# Patient Record
Sex: FEMALE | Race: WHITE | NOT HISPANIC OR LATINO | URBAN - METROPOLITAN AREA
[De-identification: names, ages, dates, MRNs, and addresses within clinical notes are randomized per-mention and may not be internally consistent; named-entity substitution may affect disease eponyms.]

---

## 2023-10-30 ENCOUNTER — TELEPHONE (OUTPATIENT)
Dept: PRIMARY CARE | Facility: CLINIC | Age: 58
End: 2023-10-30

## 2023-10-30 DIAGNOSIS — H60.339 ACUTE SWIMMER'S EAR, UNSPECIFIED LATERALITY: Primary | ICD-10-CM

## 2023-10-30 RX ORDER — NEOMYCIN SULFATE, POLYMYXIN B SULFATE, HYDROCORTISONE 3.5; 10000; 1 MG/ML; [USP'U]/ML; MG/ML
2 SOLUTION/ DROPS AURICULAR (OTIC) 4 TIMES DAILY
Qty: 10 ML | Refills: 0 | Status: SHIPPED | OUTPATIENT
Start: 2023-10-30 | End: 2023-11-06

## 2024-10-29 ENCOUNTER — OFFICE VISIT (OUTPATIENT)
Dept: PRIMARY CARE | Facility: CLINIC | Age: 59
End: 2024-10-29
Payer: COMMERCIAL

## 2024-10-29 VITALS
OXYGEN SATURATION: 97 % | RESPIRATION RATE: 20 BRPM | SYSTOLIC BLOOD PRESSURE: 120 MMHG | HEART RATE: 71 BPM | HEIGHT: 64 IN | TEMPERATURE: 98.7 F | DIASTOLIC BLOOD PRESSURE: 77 MMHG | BODY MASS INDEX: 40.97 KG/M2 | WEIGHT: 240 LBS

## 2024-10-29 DIAGNOSIS — H60.339 ACUTE SWIMMER'S EAR, UNSPECIFIED LATERALITY: ICD-10-CM

## 2024-10-29 DIAGNOSIS — D17.22 LIPOMA OF LEFT UPPER EXTREMITY: Primary | ICD-10-CM

## 2024-10-29 PROCEDURE — 99213 OFFICE O/P EST LOW 20 MIN: CPT | Performed by: FAMILY MEDICINE

## 2024-10-29 PROCEDURE — 3008F BODY MASS INDEX DOCD: CPT | Performed by: FAMILY MEDICINE

## 2024-10-29 RX ORDER — NEOMYCIN SULFATE, POLYMYXIN B SULFATE, HYDROCORTISONE 3.5; 10000; 1 MG/ML; [USP'U]/ML; MG/ML
2 SOLUTION/ DROPS AURICULAR (OTIC) 4 TIMES DAILY
Qty: 10 ML | Refills: 1 | Status: SHIPPED | OUTPATIENT
Start: 2024-10-29 | End: 2024-11-05

## 2024-10-29 ASSESSMENT — PAIN SCALES - GENERAL: PAINLEVEL_OUTOF10: 0-NO PAIN

## 2024-10-29 ASSESSMENT — ENCOUNTER SYMPTOMS
LOSS OF SENSATION IN FEET: 0
DEPRESSION: 0
OCCASIONAL FEELINGS OF UNSTEADINESS: 0

## 2024-11-25 NOTE — PROGRESS NOTES
Subjective   Patient ID: Danay Mccall is a 59 y.o. female who presents for consult of lipoma of bicep.  HPI  Patient is new to clinic and presents for consult of lipoma of bicep.  Patient referred by Dr. Saran Coon.  Patient seen Dr. Saran Coon in the office on 10/29/2024 for mass. She has enlarging mass of left deltoid region over the last few years. Some mild discomfort when she rubs it but otherwise not painful.  Left lateral deltoid region with 4-5 cm soft mobile mass consistent with moderate lipoma.  No erythema or induration.         Social History:  Tobacco Use -   Do you use any recreational drugs -   Alcohol Use -   Working -   Marital status -   Living with -     Review of Systems    Objective   Physical Exam    Assessment/Plan   {Assess/PlanSmartLinks:96702}         NAVNEET JAMES MA 11/25/24 9:27 AM

## 2024-12-02 ENCOUNTER — APPOINTMENT (OUTPATIENT)
Dept: SURGERY | Facility: CLINIC | Age: 59
End: 2024-12-02
Payer: COMMERCIAL

## 2024-12-03 ENCOUNTER — OFFICE VISIT (OUTPATIENT)
Dept: SURGERY | Facility: CLINIC | Age: 59
End: 2024-12-03
Payer: COMMERCIAL

## 2024-12-03 VITALS
DIASTOLIC BLOOD PRESSURE: 123 MMHG | TEMPERATURE: 97.8 F | WEIGHT: 259.2 LBS | HEIGHT: 64 IN | SYSTOLIC BLOOD PRESSURE: 171 MMHG | HEART RATE: 76 BPM | OXYGEN SATURATION: 98 % | BODY MASS INDEX: 44.25 KG/M2

## 2024-12-03 DIAGNOSIS — D17.22 LIPOMA OF LEFT UPPER EXTREMITY: Primary | ICD-10-CM

## 2024-12-03 DIAGNOSIS — R03.0 ELEVATED BLOOD PRESSURE READING IN OFFICE WITH WHITE COAT SYNDROME, WITHOUT DIAGNOSIS OF HYPERTENSION: ICD-10-CM

## 2024-12-03 PROBLEM — F41.9 ANXIETY: Status: ACTIVE | Noted: 2024-12-03

## 2024-12-03 PROBLEM — E66.9 OBESITY: Status: ACTIVE | Noted: 2024-12-03

## 2024-12-03 PROBLEM — R22.0 MASS OF SCALP: Status: ACTIVE | Noted: 2024-12-03

## 2024-12-03 PROBLEM — E78.5 HYPERLIPEMIA: Status: ACTIVE | Noted: 2024-12-03

## 2024-12-03 PROCEDURE — 3008F BODY MASS INDEX DOCD: CPT | Performed by: STUDENT IN AN ORGANIZED HEALTH CARE EDUCATION/TRAINING PROGRAM

## 2024-12-03 PROCEDURE — 1036F TOBACCO NON-USER: CPT | Performed by: STUDENT IN AN ORGANIZED HEALTH CARE EDUCATION/TRAINING PROGRAM

## 2024-12-03 PROCEDURE — 99204 OFFICE O/P NEW MOD 45 MIN: CPT | Performed by: STUDENT IN AN ORGANIZED HEALTH CARE EDUCATION/TRAINING PROGRAM

## 2024-12-03 PROCEDURE — 99214 OFFICE O/P EST MOD 30 MIN: CPT | Performed by: STUDENT IN AN ORGANIZED HEALTH CARE EDUCATION/TRAINING PROGRAM

## 2024-12-03 ASSESSMENT — ENCOUNTER SYMPTOMS
LOSS OF SENSATION IN FEET: 0
TREMORS: 0
MUSCULOSKELETAL NEGATIVE: 1
ADENOPATHY: 0
GASTROINTESTINAL NEGATIVE: 1
CONSTITUTIONAL NEGATIVE: 1
OCCASIONAL FEELINGS OF UNSTEADINESS: 0
NERVOUS/ANXIOUS: 1
DEPRESSION: 0
WEAKNESS: 0
CARDIOVASCULAR NEGATIVE: 1
HEADACHES: 0
RESPIRATORY NEGATIVE: 1
BRUISES/BLEEDS EASILY: 0
NUMBNESS: 0

## 2024-12-03 ASSESSMENT — PATIENT HEALTH QUESTIONNAIRE - PHQ9
1. LITTLE INTEREST OR PLEASURE IN DOING THINGS: NOT AT ALL
2. FEELING DOWN, DEPRESSED OR HOPELESS: NOT AT ALL
SUM OF ALL RESPONSES TO PHQ9 QUESTIONS 1 & 2: 0

## 2024-12-03 ASSESSMENT — PAIN SCALES - GENERAL: PAINLEVEL_OUTOF10: 0-NO PAIN

## 2024-12-03 ASSESSMENT — LIFESTYLE VARIABLES
HOW OFTEN DURING THE LAST YEAR HAVE YOU FOUND THAT YOU WERE NOT ABLE TO STOP DRINKING ONCE YOU HAD STARTED: NEVER
HOW OFTEN DURING THE LAST YEAR HAVE YOU HAD A FEELING OF GUILT OR REMORSE AFTER DRINKING: NEVER
HOW OFTEN DO YOU HAVE A DRINK CONTAINING ALCOHOL: MONTHLY OR LESS
SKIP TO QUESTIONS 9-10: 1
HOW OFTEN DURING THE LAST YEAR HAVE YOU BEEN UNABLE TO REMEMBER WHAT HAPPENED THE NIGHT BEFORE BECAUSE YOU HAD BEEN DRINKING: NEVER
HOW OFTEN DURING THE LAST YEAR HAVE YOU FAILED TO DO WHAT WAS NORMALLY EXPECTED FROM YOU BECAUSE OF DRINKING: NEVER
HOW OFTEN DO YOU HAVE SIX OR MORE DRINKS ON ONE OCCASION: NEVER
HOW OFTEN DURING THE LAST YEAR HAVE YOU NEEDED AN ALCOHOLIC DRINK FIRST THING IN THE MORNING TO GET YOURSELF GOING AFTER A NIGHT OF HEAVY DRINKING: NEVER
AUDIT-C TOTAL SCORE: 1
AUDIT TOTAL SCORE: 1
HOW MANY STANDARD DRINKS CONTAINING ALCOHOL DO YOU HAVE ON A TYPICAL DAY: 1 OR 2
HAVE YOU OR SOMEONE ELSE BEEN INJURED AS A RESULT OF YOUR DRINKING: NO
HAS A RELATIVE, FRIEND, DOCTOR, OR ANOTHER HEALTH PROFESSIONAL EXPRESSED CONCERN ABOUT YOUR DRINKING OR SUGGESTED YOU CUT DOWN: NO

## 2024-12-03 ASSESSMENT — COLUMBIA-SUICIDE SEVERITY RATING SCALE - C-SSRS
1. IN THE PAST MONTH, HAVE YOU WISHED YOU WERE DEAD OR WISHED YOU COULD GO TO SLEEP AND NOT WAKE UP?: NO
2. HAVE YOU ACTUALLY HAD ANY THOUGHTS OF KILLING YOURSELF?: NO
6. HAVE YOU EVER DONE ANYTHING, STARTED TO DO ANYTHING, OR PREPARED TO DO ANYTHING TO END YOUR LIFE?: NO

## 2024-12-03 NOTE — PATIENT INSTRUCTIONS
Ms Mccall will review options and undergo pre-operative workup (ultrasound of lesion, labwork) before making her decision regarding procedure.

## 2024-12-03 NOTE — PROGRESS NOTES
Subjective   Patient ID: Danay Mccall is a 59 y.o. female who presents for Mass (Left upper arm mass).  59F with history of previous fatty tissue excisions, most recently of left eyelid, referred to General Surgery regarding soft tissue mass of her LUE/shoulder.  Patient indicates she first noticed it about 2 years ago and it was much smaller.  Over the last 2 years, it has grown in size.  Endorses discomfort when she sleeps on it/positions her arm in a certain way, or when she palpates it.  Denies overlying skin changes, firmness, erythema, drainage, paresthesias, weakness.  Patient indicates she is very nervous in the office after her blood pressure was elevated during intake.  Patient's spouse at bedside assures patient that this happens when she sees the doctor.        Review of Systems   Constitutional: Negative.    HENT: Negative.     Respiratory: Negative.     Cardiovascular: Negative.    Gastrointestinal: Negative.    Musculoskeletal: Negative.    Skin: Negative.    Neurological:  Negative for tremors, weakness, numbness and headaches.   Hematological:  Negative for adenopathy. Does not bruise/bleed easily.   Psychiatric/Behavioral:  The patient is nervous/anxious.        Objective   Physical Exam  Vitals reviewed. Exam conducted with a chaperone present.   Constitutional:       General: She is not in acute distress.     Appearance: She is not toxic-appearing.   HENT:      Head: Normocephalic and atraumatic.      Nose: Nose normal.      Mouth/Throat:      Mouth: Mucous membranes are moist.      Pharynx: Oropharynx is clear.   Cardiovascular:      Rate and Rhythm: Normal rate and regular rhythm.   Pulmonary:      Effort: Pulmonary effort is normal. No respiratory distress.   Abdominal:      General: There is no distension.      Palpations: Abdomen is soft.   Musculoskeletal:         General: No swelling, tenderness, deformity or signs of injury. Normal range of motion.      Cervical back: No rigidity  or tenderness.      Comments: LUE with 5x7cm mobile, circumscribed soft mass   Skin:     General: Skin is warm.      Coloration: Skin is not jaundiced.      Findings: No bruising or lesion.   Neurological:      General: No focal deficit present.      Mental Status: She is alert. Mental status is at baseline.         Assessment/Plan   Diagnoses and all orders for this visit:  Lipoma of left upper extremity  -     Referral to General Surgery  -     CBC; Future  -     Basic metabolic panel; Future  -     APTT; Future  -     Protime-INR; Future  -     US MSK upper extremity joints tendons muscles; Future  Elevated blood pressure reading in office with white coat syndrome, without diagnosis of hypertension      Patient counseled regarding surgical options and workup required.  She is hoping for a bedside/office procedure to remove this mass (as she had done with a smaller cyst on her scalp).  I assured patient that we would need to be sure mass is safe to be removed in that manner rather than in the operating room with sedation/anesthesia.    Patient has instructions for outpatient workup and will followup with General Surgery once she feels comfortable proceeding with a plan.      Time Spent  Prep time on day of patient encounter: 20 minutes  Time spent directly with patient, family or caregiver: 20 minutes  Additional Time Spent on Patient Care Activities: 5 minutes  Documentation Time: 10 minutes        Deedee Yin MD 12/03/24 10:54 AM

## 2024-12-03 NOTE — LETTER
December 3, 2024     Saran Coon DO  [address]  [address]  [facility] [address]    Patient: Danay Mccall   YOB: 1965   Date of Visit: 12/3/2024       Dear Dr. Saran Coon DO:    Thank you for referring Danay Mccall to me for evaluation. Below are my notes for this consultation.  She was very nervous. I hope I explained her options well.  I would recommend going to the operating room, but we will do an ultrasound and also labwork to see if this mass is possible to be excised bedside in the office with a lot of local anesthesia. If you have questions, please do not hesitate to call me. I look forward to following your patient along with you.       Sincerely,     Deedee Yin MD      CC: No Recipients  ______________________________________________________________________________________    Subjective  Patient ID: Danay Mccall is a 59 y.o. female who presents for Mass (Left upper arm mass).  59F with history of previous fatty tissue excisions, most recently of left eyelid, referred to General Surgery regarding soft tissue mass of her LUE/shoulder.  Patient indicates she first noticed it about 2 years ago and it was much smaller.  Over the last 2 years, it has grown in size.  Endorses discomfort when she sleeps on it/positions her arm in a certain way, or when she palpates it.  Denies overlying skin changes, firmness, erythema, drainage, paresthesias, weakness.  Patient indicates she is very nervous in the office after her blood pressure was elevated during intake.  Patient's spouse at bedside assures patient that this happens when she sees the doctor.        Review of Systems   Constitutional: Negative.    HENT: Negative.     Respiratory: Negative.     Cardiovascular: Negative.    Gastrointestinal: Negative.    Musculoskeletal: Negative.    Skin: Negative.    Neurological:  Negative for tremors, weakness, numbness and headaches.   Hematological:  Negative  for adenopathy. Does not bruise/bleed easily.   Psychiatric/Behavioral:  The patient is nervous/anxious.        Objective  Physical Exam  Vitals reviewed. Exam conducted with a chaperone present.         Assessment/Plan  Diagnoses and all orders for this visit:  Lipoma of left upper extremity  -     Referral to General Surgery  -     CBC; Future  -     Basic metabolic panel; Future  -     APTT; Future  -     Protime-INR; Future  -     US MSK upper extremity joints tendons muscles; Future  Elevated blood pressure reading in office with white coat syndrome, without diagnosis of hypertension      Patient counseled regarding surgical options and workup required.  She is hoping for a bedside/office procedure to remove this mass (as she had done with a smaller cyst on her scalp).  I assured patient that we would need to be sure mass is safe to be removed in that manner rather than in the operating room with sedation/anesthesia.    Patient has instructions for outpatient workup and will followup with General Surgery once she feels comfortable proceeding with a plan.      Time Spent  Prep time on day of patient encounter: 20 minutes  Time spent directly with patient, family or caregiver: 20 minutes  Additional Time Spent on Patient Care Activities: 5 minutes  Documentation Time: 10 minutes        Deedee Yin MD 12/03/24 10:54 AM

## 2025-01-27 NOTE — PROGRESS NOTES
Subjective   Patient ID: Danay Mccall is a 59 y.o. female who presents for lipoma on left upper extremity.     HPI  Patient is new to clinic and presents for a lipoma on the left upper extremity for a second opinion.   Patient saw Dr Coon on 10/29/2024. Per Dr Coon she has enlarging mass of left deltoid region over the last few years. Some mild discomfort when she rubs it but otherwise not painful.   Patient also recently seen Dr Deedee Yin on 12/3/2024 for this lipoma. Per Dr Yin patient indicates she first noticed it about 2 years ago and it was much smaller. Over the last 2 years, it has grown in size. Endorses discomfort when she sleeps on it/positions her arm in a certain way, or when she palpates it. Denies overlying skin changes, firmness, erythema, drainage, paresthesias, weakness. Patient indicates she is very nervous in the office after her blood pressure was elevated during intake. Patient's spouse at bedside assures patient that this happens when she sees the doctor.     Dr Yin discussed with the patient that she would need further workup to make sure lipoma could be removed in office rather than in OR. Patient wanted to have it removed in office setting like she had in the past. Dr Yin told her to follow up as needed. Dr Yin did order imaging that the patient wants to hold off on until after she sees Dr Chua.     Social History:  Tobacco Use -   Do you use any recreational drugs -  Alcohol Use -  Caffeine Intake -   Working -   Marital status -   Living with -    Past Medical History:   Diagnosis Date    Dermoid cyst of head     Scoliosis      Past Surgical History:   Procedure Laterality Date    EYE SURGERY       Family History   Problem Relation Name Age of Onset    Heart attack Mother      No Known Problems Father      Cystic fibrosis Sister      No Known Problems Sister      No Known Problems Brother      No Known Problems Brother        Allergies   Allergen Reactions    Red Dye Rash         Review of Systems  There were no vitals taken for this visit.    Objective   Physical Exam    Assessment/Plan   {Assess/PlanSmartLinks:66182}         Hoda Nicole MA 01/27/25 10:15 AM

## 2025-02-03 ENCOUNTER — TELEPHONE (OUTPATIENT)
Dept: PRIMARY CARE | Facility: CLINIC | Age: 60
End: 2025-02-03
Payer: COMMERCIAL

## 2025-02-04 ENCOUNTER — APPOINTMENT (OUTPATIENT)
Dept: SURGERY | Facility: CLINIC | Age: 60
End: 2025-02-04
Payer: COMMERCIAL

## 2025-02-05 ENCOUNTER — TELEPHONE (OUTPATIENT)
Dept: PRIMARY CARE | Facility: CLINIC | Age: 60
End: 2025-02-05
Payer: COMMERCIAL

## 2025-02-05 DIAGNOSIS — R22.30 MASS OF UPPER EXTREMITY, UNSPECIFIED LATERALITY: ICD-10-CM

## 2025-03-11 PROBLEM — H93.19 TINNITUS: Status: ACTIVE | Noted: 2025-03-11

## 2025-03-11 PROBLEM — G43.109 OPHTHALMIC MIGRAINE: Status: ACTIVE | Noted: 2025-03-11

## 2025-03-11 PROBLEM — H60.90 OTITIS EXTERNA: Status: ACTIVE | Noted: 2025-03-11

## 2025-03-11 NOTE — PROGRESS NOTES
Subjective   Patient ID: Danay Mccall is a 59 y.o. female who presents for a second opinion on a lipoma of left upper extremity     HPI  Patient is new to the clinic and presents for a second opinion consult on a lipoma of left upper extremity.   Patient previously had a consult with Dr Deedee Yin on 12/03/2024. At that time Dr Yin documented that patient presents for Mass (Left upper arm mass). 59F with history of previous fatty tissue excisions, most recently of left eyelid, referred to General Surgery regarding soft tissue mass of her LUE/shoulder.  Patient indicates she first noticed it about 2 years ago and it was much smaller.  Over the last 2 years, it has grown in size.  Endorses discomfort when she sleeps on it/positions her arm in a certain way, or when she palpates it.  Denies overlying skin changes, firmness, erythema, drainage, paresthesias, weakness.  Patient indicates she is very nervous in the office after her blood pressure was elevated during intake.  Patient's spouse at bedside assures patient that this happens when she sees the doctor.    Dr Yin did order imaging for patient to complete, which patient stated wanted to hold off on getting until she spoke with Dr Chua.   Has an additional lesion in the right upper extremity as well that is been there for years has not grown but causes some discomfort when palpated.    Social History:  Tobacco Use - NO  Do you use any recreational drugs -NO  Alcohol Use -NO  Caffeine Intake - YES  Working - F/T  Marital status -   Living with -     Past Medical History:   Diagnosis Date    Dermoid cyst of head     Scoliosis      Past Surgical History:   Procedure Laterality Date    EYE SURGERY       Family History   Problem Relation Name Age of Onset    Heart attack Mother      No Known Problems Father      Cystic fibrosis Sister      No Known Problems Sister      No Known Problems Brother      No Known Problems  "Brother       Allergies   Allergen Reactions    Red Dye Rash       Review of Systems  Pulse 73   Temp 36.4 °C (97.5 °F) (Temporal)   Resp 18   Ht 1.626 m (5' 4\")   Wt 117 kg (259 lb)   SpO2 98%   BMI 44.46 kg/m²     Objective   Physical Exam  Pulse 73   Temp 36.4 °C (97.5 °F) (Temporal)   Resp 18   Ht 1.626 m (5' 4\")   Wt 117 kg (259 lb)   SpO2 98%   BMI 44.46 kg/m²    GENERAL APPEARANCE: Patient appears in no acute distress.   EYES: Sclera non-icteric, PERRLA.   ENT Normal appearance of ears and nose.   NECK/THYROID: Neck: no masses. Thyroid: no masses.   LYMPH NODES: No cervical or supraclavicular lymphadenopathy.   CARDIOVASCULAR Heart: RRR, no murmurs; Carotid bruits: none; Peripheral edema: none.   RESPIRATORY: Lungs: clear to auscultation bilaterally; no respiratory distress.   EXTREMITIES : Left upper extremity just inferior to the shoulder anterior aspect there is a 7 cm soft tissue mass.  It palpates to be deep within the muscle.  There is an additional right upper extremity mass medial aspect measuring approximately 3 cm.  Both of these have the consistency of lipoma although the one on the left palpates to be embedded within the muscle and the one on the right is much more superficial.  PSYCH: Patient oriented to time, place and person, normal affect.    Assessment/Plan   Problem List Items Addressed This Visit             ICD-10-CM    Mass of soft tissue of left upper extremity - Primary M79.89     With a large soft tissue mass of the left upper extremity anterior inferior to shoulder on the upper arm.  This measures approximately 7 cm.  This palpates to be embedded within the muscle.  Recommend excision in the operating room with sedation.  Risk-benefit alternatives of excising both lesions were thoroughly discussed with the patient agrees to proceed         Relevant Orders    Case Request Operating Room: Elbow Lesion Excision (Completed)    Mass of soft tissue of right upper extremity " M79.89     With a 3 cm lesion right upper arm medial aspect consistent with a lipoma.  Recommend excision.  Risk-benefit alternatives of doing the surgery were thoroughly discussed with the patient agrees to proceed         Relevant Orders    Case Request Operating Room: Elbow Lesion Excision (Completed)            Gladys Chua MD 03/20/25 12:56 PM

## 2025-03-20 ENCOUNTER — OFFICE VISIT (OUTPATIENT)
Dept: SURGERY | Facility: CLINIC | Age: 60
End: 2025-03-20
Payer: COMMERCIAL

## 2025-03-20 VITALS
HEART RATE: 73 BPM | RESPIRATION RATE: 18 BRPM | TEMPERATURE: 97.5 F | HEIGHT: 64 IN | WEIGHT: 259 LBS | OXYGEN SATURATION: 98 % | BODY MASS INDEX: 44.22 KG/M2

## 2025-03-20 DIAGNOSIS — M79.89 MASS OF SOFT TISSUE OF LEFT UPPER EXTREMITY: Primary | ICD-10-CM

## 2025-03-20 DIAGNOSIS — M79.89 MASS OF SOFT TISSUE OF RIGHT UPPER EXTREMITY: ICD-10-CM

## 2025-03-20 PROCEDURE — 99215 OFFICE O/P EST HI 40 MIN: CPT | Performed by: SURGERY

## 2025-03-20 PROCEDURE — 1036F TOBACCO NON-USER: CPT | Performed by: SURGERY

## 2025-03-20 PROCEDURE — 3008F BODY MASS INDEX DOCD: CPT | Performed by: SURGERY

## 2025-03-20 RX ORDER — CEFAZOLIN SODIUM 2 G/100ML
2 INJECTION, SOLUTION INTRAVENOUS ONCE
OUTPATIENT
Start: 2025-03-20 | End: 2025-03-20

## 2025-03-20 ASSESSMENT — PAIN SCALES - GENERAL: PAINLEVEL_OUTOF10: 0-NO PAIN

## 2025-03-20 ASSESSMENT — PATIENT HEALTH QUESTIONNAIRE - PHQ9
1. LITTLE INTEREST OR PLEASURE IN DOING THINGS: NOT AT ALL
SUM OF ALL RESPONSES TO PHQ9 QUESTIONS 1 AND 2: 0
2. FEELING DOWN, DEPRESSED OR HOPELESS: NOT AT ALL

## 2025-03-20 NOTE — PATIENT INSTRUCTIONS
Thank you for scheduling surgery with Dr. Chua   Below you will find your Surgery Itinerary to include dates, times, and locations for appointments involved with your procedure.      A representative from the hospital will contact you directly to schedule any Pre Admission Testing appointments needed.    On the day before the scheduled surgery, please call the Same Day Surgery department between 2-4 pm for a time of arrival for the day of procedure.  [facility] [phone]    Nothing to eat or drink after midnight the night before surgery    Surgery with Dr. Chua at [facility] - [address], [place] [address]  On: 5/14/2025    Postoperative appointment is scheduled at Surgery office locatoins: [facility] [address] [place], [place] [id number] [phone]                    On: 5/29/2025 at 9:30 AM    *Please note, you may receive a call from our financial counselors if you have a financial liability greater than $250.           [facility]  Pre - Operative Instructions     Your time of arrival for surgery is available the day before surgery. *If the surgery is on a Monday, or the Tuesday following a Monday Holiday, please call Same Day Surgery the Friday before your surgery date.*    DO NOT EAT OR DRINK ANYTHING AFTER MIDNIGHT THE NIGHT BEFORE SURGERY. This includes any beverages (coffee, water, soda, etc.), hard candy, gum or mints. If this is not followed, surgery may be canceled. Please avoid eating a large meal the evening before surgery. Please do not DRINK ALCOHOL or SMOKE FOR 24 HOURS before surgery.     Insulin Instructions - Please do not take any short acting Insulin (Regular or NPH). Do not take any oral diabetic medication on the day of surgery. Long acting Insulins may be taken (Lantus). We will check your blood sugar and administer at the hospital the day of surgery. Patient who have Insulin pumps are to make NO  adjustments.     Prescription Medications - You are encouraged to take prescription medications including heart, blood pressure, anti-seizure, anxiety, breathing medications (including inhalers) with exception to diabetic medications prior to arriving at the hospital the day of surgery. You may take prescribed pain medications as needed. Please remember the dose and time taken so we may inform your Anesthesiologist.     Please bring the name, dosage, and frequency of your medications if you did not provide these on the day of Pre Admission Testing. You may bring the actual bottles if this would be easier for you.     Please bring your prescribed inhalers with you the morning of surgery.     Patients on Anti-platelet and Anticoagulant agents, please read the following:  ASA, NSAIDS stop 5 days prior to surgery  Coumadin stop 5 days prior to surgery unless bridging therapy is needed (metal valve replacement, cardiac stent placement) - if so please speak to your Cardiologist or prescribing physician.   Other anti-platelet and anticoagulant agents:  Plavix (Clopidogrel) stop 5 days prior to surgery  Brilinta (Ticagrelor) stop 5 days prior to surgery   Effient (Prasugrel) stop 7 days prior to surgery   Lovenox (Enoxaparin) stop 24 hours prior to surgery  Arixtra (Fondaparinux) stop 5 days prior to surgery  Xarelto (Rivaroxaban) stop 3 days prior to surgery  Pradaxa (Dabigatran) stop 5 days prior to surgery  Eliquis (Apixaban) stop 3 days prior to surgery   Savaysa (Endoxaban) stop days prior to surgery     Patients on GLP-inhibitors  oral and injectable along with SGLT2 inhibitors please read the following:   SGLT2 inhibitors:        Ertugliflozin (Steglatro)- Stop a full 4 days before surgical/procedure date  Bexagliflozin (Brenzavvy)- Stop a full 3 days before surgical/procedure date  Canagliflozin (Invokana)- Stop a full 3 days before surgical/procedure date  Dapagliflozin (Farxiga)- Stop a full 3 days before  surgical/procedure date  Emagliflozin (Jardiance) - Stop a full 3 days before surgical/procedure date    GLP-1 Inhibitors oral:  Semaglutide (Rybelus)- Hold day of surgery only     GLP-1 Inhibitors Injection weekly:  Dulaglutide (Trulicity) -Stop a full 7 days before surgical/procedure date   Exenatide ER (Bydyreon, Bcise)-Stop a full 7 days before surgical/procedure date   Semiglutide (Ozempic, Wegovy)-Stop a full 7 days before surgical/procedure date     GLP-1 inhibitors injection twice a day:  Exenatide IR (Byetta)- Hold day of surgery only    GLP-1 inhibitors injection daily:  Liraglutide (Saxenda, Victoza)- Hold day of surgery only  Lixisenatide (Adlyxin) -Hold day of surgery only     Please stop all herbal medications 2 weeks prior to surgery     C-PAP Devices - If you have a C-PAP device at home, bring it with you on our day of surgery if your surgery requires you to stay overnight.     Please bring a copy of any Advanced Directives the day of surgery if you did not provide it at Pre Admission Testing. These documents are living pedro and durable power of  for healthcare.     Please notify your physician/surgeon if you develop a cold, sore throat, fever, flu symptoms, COVID symptoms or any changes in your physical conditions.     A shower or bath is preferred the evening before or the morning of surgery.     Remove jewelry before admission to the hospital. It is no longer permitted to tape rings. We ask that you leave all valuables at home. Any items of value will be given to a family member or locked up by security.   If you have a body piercing g that you cannot remove, it is recommended that you have it removed professionally and have a plastic spacer inserted. There is a risk for surgical burns with jewelry left in place.     Remove or wear minimal makeup the day of surgery. You will be asked to remove glasses and contact lenses prior to surgery. Please bring a glass case and/or contact lens case  with you. These items are not provided.     Dentures and partials are usually removed prior to surgery. A denture cup will be provided for you.       You may be asked to remove nail polish. Acrylic nails are now acceptable.     Wear loose comfortable clothing that you will be able to fit over bandages when you leave the hospitals (as appropriate for your surgery).    Patients that are under the age of 18 years must have a parent or guardian present the day of surgery.     Family members of significant others may stay with you on the Same Day Surgery unit. While you are in surgery, they may wait for you in the family waiting area. The physician will speak to the waiting family, if permitted by the patient, after surgery.     Changes or delays in the surgery schedule may occur due to emergencies. The hospital will notify you if this occurs. We apologize for any inconveniences this may present.     You must have a responsible  available to drive you home after surgery. You will not be permitted to drive yourself home after surgery if you have received any anesthesia or sedation during your procedure.     Please visit our website at [email or web address] for more information regarding [facility] services.      For questions about your Pre Admission Testing (PAT)  [facility] [phone]  [facility] [phone]    Thank you for choosing [facility]!

## 2025-03-20 NOTE — ASSESSMENT & PLAN NOTE
With a large soft tissue mass of the left upper extremity anterior inferior to shoulder on the upper arm.  This measures approximately 7 cm.  This palpates to be embedded within the muscle.  Recommend excision in the operating room with sedation.  Risk-benefit alternatives of excising both lesions were thoroughly discussed with the patient agrees to proceed

## 2025-03-20 NOTE — ASSESSMENT & PLAN NOTE
With a 3 cm lesion right upper arm medial aspect consistent with a lipoma.  Recommend excision.  Risk-benefit alternatives of doing the surgery were thoroughly discussed with the patient agrees to proceed

## 2025-04-30 ENCOUNTER — PRE-ADMISSION TESTING (OUTPATIENT)
Dept: PREADMISSION TESTING | Facility: HOSPITAL | Age: 60
End: 2025-04-30
Payer: COMMERCIAL

## 2025-04-30 VITALS
SYSTOLIC BLOOD PRESSURE: 148 MMHG | HEART RATE: 97 BPM | WEIGHT: 266 LBS | BODY MASS INDEX: 45.41 KG/M2 | OXYGEN SATURATION: 100 % | RESPIRATION RATE: 16 BRPM | DIASTOLIC BLOOD PRESSURE: 83 MMHG | TEMPERATURE: 97.7 F | HEIGHT: 64 IN

## 2025-04-30 DIAGNOSIS — Z01.818 PRE-OP TESTING: ICD-10-CM

## 2025-04-30 LAB
ANION GAP SERPL CALCULATED.3IONS-SCNC: 13 MMOL/L (ref 10–20)
BASOPHILS # BLD AUTO: 0.04 X10*3/UL (ref 0–0.1)
BASOPHILS NFR BLD AUTO: 0.4 %
BUN SERPL-MCNC: 12 MG/DL (ref 6–23)
CALCIUM SERPL-MCNC: 9 MG/DL (ref 8.6–10.3)
CHLORIDE SERPL-SCNC: 106 MMOL/L (ref 98–107)
CO2 SERPL-SCNC: 26 MMOL/L (ref 21–32)
CREAT SERPL-MCNC: 0.7 MG/DL (ref 0.5–1.05)
EGFRCR SERPLBLD CKD-EPI 2021: >90 ML/MIN/1.73M*2
EOSINOPHIL # BLD AUTO: 0.16 X10*3/UL (ref 0–0.7)
EOSINOPHIL NFR BLD AUTO: 1.7 %
ERYTHROCYTE [DISTWIDTH] IN BLOOD BY AUTOMATED COUNT: 13 % (ref 11.5–14.5)
GLUCOSE SERPL-MCNC: 82 MG/DL (ref 74–99)
HCT VFR BLD AUTO: 45.6 % (ref 36–46)
HGB BLD-MCNC: 14.7 G/DL (ref 12–16)
IMM GRANULOCYTES # BLD AUTO: 0.04 X10*3/UL (ref 0–0.7)
IMM GRANULOCYTES NFR BLD AUTO: 0.4 % (ref 0–0.9)
LYMPHOCYTES # BLD AUTO: 2.21 X10*3/UL (ref 1.2–4.8)
LYMPHOCYTES NFR BLD AUTO: 23.7 %
MCH RBC QN AUTO: 29.7 PG (ref 26–34)
MCHC RBC AUTO-ENTMCNC: 32.2 G/DL (ref 32–36)
MCV RBC AUTO: 92 FL (ref 80–100)
MONOCYTES # BLD AUTO: 0.53 X10*3/UL (ref 0.1–1)
MONOCYTES NFR BLD AUTO: 5.7 %
NEUTROPHILS # BLD AUTO: 6.36 X10*3/UL (ref 1.2–7.7)
NEUTROPHILS NFR BLD AUTO: 68.1 %
NRBC BLD-RTO: 0 /100 WBCS (ref 0–0)
PLATELET # BLD AUTO: 241 X10*3/UL (ref 150–450)
POTASSIUM SERPL-SCNC: 4.7 MMOL/L (ref 3.5–5.3)
RBC # BLD AUTO: 4.95 X10*6/UL (ref 4–5.2)
SODIUM SERPL-SCNC: 140 MMOL/L (ref 136–145)
WBC # BLD AUTO: 9.3 X10*3/UL (ref 4.4–11.3)

## 2025-04-30 PROCEDURE — 36415 COLL VENOUS BLD VENIPUNCTURE: CPT

## 2025-04-30 PROCEDURE — 85025 COMPLETE CBC W/AUTO DIFF WBC: CPT

## 2025-04-30 PROCEDURE — 82374 ASSAY BLOOD CARBON DIOXIDE: CPT

## 2025-04-30 PROCEDURE — 93010 ELECTROCARDIOGRAM REPORT: CPT | Performed by: INTERNAL MEDICINE

## 2025-04-30 PROCEDURE — 93005 ELECTROCARDIOGRAM TRACING: CPT

## 2025-04-30 PROCEDURE — 99204 OFFICE O/P NEW MOD 45 MIN: CPT | Performed by: PHYSICIAN ASSISTANT

## 2025-04-30 ASSESSMENT — DUKE ACTIVITY SCORE INDEX (DASI)
CAN YOU PARTICIPATE IN STRENOUS SPORTS LIKE SWIMMING, SINGLES TENNIS, FOOTBALL, BASKETBALL, OR SKIING: NO
CAN YOU RUN A SHORT DISTANCE: YES
CAN YOU PARTICIPATE IN MODERATE RECREATIONAL ACTIVITIES LIKE GOLF, BOWLING, DANCING, DOUBLES TENNIS OR THROWING A BASEBALL OR FOOTBALL: YES
DASI METS SCORE: 9
TOTAL_SCORE: 50.7
CAN YOU WALK INDOORS, SUCH AS AROUND YOUR HOUSE: YES
CAN YOU DO LIGHT WORK AROUND THE HOUSE LIKE DUSTING OR WASHING DISHES: YES
CAN YOU HAVE SEXUAL RELATIONS: YES
CAN YOU WALK A BLOCK OR TWO ON LEVEL GROUND: YES
CAN YOU CLIMB A FLIGHT OF STAIRS OR WALK UP A HILL: YES
CAN YOU DO MODERATE WORK AROUND THE HOUSE LIKE VACUUMING, SWEEPING FLOORS OR CARRYING GROCERIES: YES
CAN YOU DO YARD WORK LIKE RAKING LEAVES, WEEDING OR PUSHING A MOWER: YES
CAN YOU DO HEAVY WORK AROUND THE HOUSE LIKE SCRUBBING FLOORS OR LIFTING AND MOVING HEAVY FURNITURE: YES
CAN YOU TAKE CARE OF YOURSELF (EAT, DRESS, BATHE, OR USE TOILET): YES

## 2025-04-30 ASSESSMENT — ENCOUNTER SYMPTOMS: ARTHRALGIAS: 1

## 2025-04-30 NOTE — PREPROCEDURE INSTRUCTIONS
Preoperative Fasting Guidelines    Why must I stop eating and drinking near surgery time?  With sedation, food or liquid in your stomach can enter your lungs causing serious complications  Increases nausea and vomiting    When do I need to stop eating and drinking before my surgery?  Do not eat any food after midnight the night before your surgery/procedure.  You may have up to 13 ounces of clear liquid until TWO hours before your instructed arrival time to the hospital.  This includes water, black tea/coffee, (no milk or cream) apple juice, and electrolyte drinks (Gatorade)  You may chew gum until TWO hours before your surgery/procedure    PAT DISCHARGE INSTRUCTIONS    Please call the Same Day Surgery (SDS) Department of the hospital where your procedure will be performed after 2:00 PM the day before your surgery. If you are scheduled on a Monday, or a Tuesday following a Monday holiday, you will need to call on the last business day prior to your surgery.    [facility]  [id number] [place] [address], [id number] [phone]    [place] [id number] [place] [id number] [phone]    [facility]  [address] [place] [address]  [phone]    Please let your surgeon know if:      You develop any open sores, shingles, burning or painful urination as these may increase your risk of an infection.   You no longer wish to have the surgery.   Any other personal circumstances change that may lead to the need to cancel or defer this surgery-such as being sick or getting admitted to any hospital within one week of your planned procedure.    Your contact details change, such as a change of address or phone number.    Starting now:     Please DO NOT drink alcohol or smoke for 24 hours before surgery. It is well known that quitting smoking can make a huge difference to your health and recovery from  surgery. The longer you abstain from smoking, the better your chances of a healthy recovery. If you need help with quitting, call 4-800QUIT-NOW to be connected to a trained counselor who will discuss the best methods to help you quit.     Before your surgery:    Please stop all supplements 7 days prior to surgery. Or as directed by your surgeon.   Please stop taking NSAID pain medicine such as Advil and Motrin 7 days before surgery.    If you develop any fever, cough, cold, rashes, cuts, scratches, scrapes, urinary symptoms or infection anywhere on your body (including teeth and gums) prior to surgery, please call your surgeon’s office as soon as possible. This may require treatment to reduce the chance of cancellation on the day of surgery.    The day before your surgery:   Get a good night’s rest.  Use the special soap for bathing if you have been instructed to use one.    Scheduled surgery times may change and you will be notified if this occurs - please check your personal voicemail for any updates.     On the morning of surgery:   Wear comfortable, loose fitting clothes which open in the front. Please do not wear moisturizers, creams, lotions, makeup or perfume.    Please bring with you to surgery:   Photo ID and insurance card   Current list of medicines and allergies   Pacemaker/ Defibrillator/Heart stent cards   CPAP machine and mask    Slings/ splints/ crutches   A copy of your complete advanced directive/DHPOA.    Please do NOT bring with you to surgery:   All jewelry and valuables should be left at home.   Prosthetic devices such as contact lenses, hearing aids, dentures, eyelash extensions, hairpins and body piercings must be removed prior to going in to the surgical suite.    After outpatient surgery:   A responsible adult MUST accompany you at the time of discharge and stay with you for 24 hours after your surgery. You may NOT drive yourself home after surgery.    Do not drive, operate machinery, make  critical decisions or do activities that require co-ordination or balance until after a night’s sleep.   Do not drink alcoholic beverages for 24 hours.   Instructions for resuming your medications will be provided by your surgeon.    CALL YOUR DOCTOR AFTER SURGERY IF YOU HAVE:     Chills and/or a fever of 101° F or higher.    Redness, swelling, pus or drainage from your surgical wound or a bad smell from the wound.    Lightheadedness, fainting or confusion.    Persistent vomiting (throwing up) and are not able to eat or drink for 12 hours.    Three or more loose, watery bowel movements in 24 hours (diarrhea).   Difficulty or pain while urinating( after non-urological surgery)    Pain and swelling in your legs, especially if it is only on one side.    Difficulty breathing or are breathing faster than normal.    Any new concerning symptoms.                Medication List      as of April 30, 2025  9:18 AM     You have not been prescribed any medications.

## 2025-04-30 NOTE — CPM/PAT H&P
"CPM/PAT Evaluation       Name: Danay Mccall (Danay Mccall)  /Age: 1965/60 y.o.     In-Person       Chief Complaint: \"arm lesions\"    HPI  The patient is a 60 year old female.  Several years ago she developed a small lesion on the right upper arm which has remained unchanged in size.  A few years ago she developed a left upper arm lesion that has increased in size.  She has some left arm discomfort when lying on the lesion.  She was seen by Dr. Chua and surgical excision is recommended at this time.    Past Medical History:   Diagnosis Date    Dermoid cyst of head     Hyperlipidemia     Obesity     Scoliosis        Past Surgical History:   Procedure Laterality Date     SECTION, LOW TRANSVERSE      X 2    EYE SURGERY Left     Excision medial left eye lesion    OTHER SURGICAL HISTORY      Excision scalp lipoma    WISDOM TOOTH EXTRACTION         Family History   Problem Relation Name Age of Onset    Heart attack Mother      No Known Problems Father      Cystic fibrosis Sister      No Known Problems Sister      No Known Problems Brother      No Known Problems Brother       Social History     Tobacco Use    Smoking status: Never     Passive exposure: Never    Smokeless tobacco: Never   Substance Use Topics    Alcohol use: Yes     Alcohol/week: 2.0 standard drinks of alcohol     Types: 2 Standard drinks or equivalent per week     Social History     Substance and Sexual Activity   Drug Use Never     Allergies   Allergen Reactions    Red Dye Rash       No current outpatient medications on file.     No current facility-administered medications for this visit.     Review of Systems   Musculoskeletal:  Positive for arthralgias.   All other systems reviewed and are negative.    /83   Pulse 97   Temp 36.5 °C (97.7 °F) (Temporal)   Resp 16   Ht 1.613 m (5' 3.5\")   Wt 121 kg (266 lb)   SpO2 100%   BMI 46.38 kg/m²     Physical Exam  Vitals reviewed.   Constitutional:       Appearance: " She is obese.   HENT:      Head: Normocephalic and atraumatic.      Mouth/Throat:      Mouth: Mucous membranes are moist.      Pharynx: Oropharynx is clear.   Eyes:      Extraocular Movements: Extraocular movements intact.      Pupils: Pupils are equal, round, and reactive to light.   Cardiovascular:      Rate and Rhythm: Normal rate and regular rhythm.      Heart sounds: Normal heart sounds.   Pulmonary:      Effort: Pulmonary effort is normal.      Breath sounds: Normal breath sounds.   Abdominal:      General: Bowel sounds are normal.      Palpations: Abdomen is soft.   Musculoskeletal:         General: No swelling.      Comments: Scoliosis noted   Skin:     General: Skin is warm and dry.      Comments: Non-tender, mobile golf-ball size mass noted left upper arm.  Small non-tender mass noted right upper arm.   Neurological:      General: No focal deficit present.      Mental Status: She is alert and oriented to person, place, and time.   Psychiatric:         Mood and Affect: Mood normal.         Behavior: Behavior normal.          PAT AIRWAY:   Airway:     Mallampati::  II    TM distance::  >3 FB    Neck ROM::  Full   Teeth intact    ASA:  III  DASI SCORE:  50.7  METS SCORE:  9  CHAD2 SCORE:  1.9%  REVISED CARDIAC RISK INDEX:  0.4%  STOP BANG SCORE:  2  CAPRINI DVT SCORE:  6  ARISCAT SCORE:   1.6%    EKG (preliminary in PAT) - normal sinus rhythm  CBC, BMP ordered during PAT visit    Assessment and Plan:     Mass of soft tissue of left upper extremity, mass of soft tissue of right upper extremity:  Excision soft tissue mass bilateral upper extremities  Severe obesity - BMI:  46.38  Scoliosis    Keisha Najera PA-C

## 2025-05-01 LAB
ATRIAL RATE: 97 BPM
P AXIS: 53 DEGREES
P OFFSET: 200 MS
P ONSET: 142 MS
PR INTERVAL: 154 MS
Q ONSET: 219 MS
QRS COUNT: 16 BEATS
QRS DURATION: 76 MS
QT INTERVAL: 340 MS
QTC CALCULATION(BAZETT): 431 MS
QTC FREDERICIA: 398 MS
R AXIS: 21 DEGREES
T AXIS: 27 DEGREES
T OFFSET: 389 MS
VENTRICULAR RATE: 97 BPM

## 2025-05-14 ENCOUNTER — ANESTHESIA (OUTPATIENT)
Dept: OPERATING ROOM | Facility: HOSPITAL | Age: 60
End: 2025-05-14
Payer: COMMERCIAL

## 2025-05-14 ENCOUNTER — ANESTHESIA EVENT (OUTPATIENT)
Dept: OPERATING ROOM | Facility: HOSPITAL | Age: 60
End: 2025-05-14
Payer: COMMERCIAL

## 2025-05-14 ENCOUNTER — HOSPITAL ENCOUNTER (OUTPATIENT)
Facility: HOSPITAL | Age: 60
Setting detail: OUTPATIENT SURGERY
Discharge: HOME | End: 2025-05-14
Attending: SURGERY | Admitting: SURGERY
Payer: COMMERCIAL

## 2025-05-14 ENCOUNTER — PHARMACY VISIT (OUTPATIENT)
Dept: PHARMACY | Facility: CLINIC | Age: 60
End: 2025-05-14
Payer: MEDICARE

## 2025-05-14 VITALS
TEMPERATURE: 96.8 F | HEART RATE: 86 BPM | OXYGEN SATURATION: 97 % | DIASTOLIC BLOOD PRESSURE: 87 MMHG | BODY MASS INDEX: 47.13 KG/M2 | SYSTOLIC BLOOD PRESSURE: 146 MMHG | WEIGHT: 266 LBS | RESPIRATION RATE: 20 BRPM | HEIGHT: 63 IN

## 2025-05-14 DIAGNOSIS — M79.89 MASS OF SOFT TISSUE OF LEFT UPPER EXTREMITY: Primary | ICD-10-CM

## 2025-05-14 DIAGNOSIS — M79.89 MASS OF SOFT TISSUE OF RIGHT UPPER EXTREMITY: ICD-10-CM

## 2025-05-14 DIAGNOSIS — E66.813 CLASS 3 SEVERE OBESITY WITH BODY MASS INDEX (BMI) OF 40.0 TO 44.9 IN ADULT, UNSPECIFIED OBESITY TYPE, UNSPECIFIED WHETHER SERIOUS COMORBIDITY PRESENT: ICD-10-CM

## 2025-05-14 DIAGNOSIS — Z01.818 PRE-OP TESTING: ICD-10-CM

## 2025-05-14 LAB — PREGNANCY TEST URINE, POC: NORMAL

## 2025-05-14 PROCEDURE — 11402 EXC TR-EXT B9+MARG 1.1-2 CM: CPT | Performed by: SURGERY

## 2025-05-14 PROCEDURE — 2500000004 HC RX 250 GENERAL PHARMACY W/ HCPCS (ALT 636 FOR OP/ED): Performed by: SURGERY

## 2025-05-14 PROCEDURE — 2720000007 HC OR 272 NO HCPCS: Performed by: SURGERY

## 2025-05-14 PROCEDURE — RXMED WILLOW AMBULATORY MEDICATION CHARGE

## 2025-05-14 PROCEDURE — 88304 TISSUE EXAM BY PATHOLOGIST: CPT | Mod: TC,TRILAB,WESLAB | Performed by: SURGERY

## 2025-05-14 PROCEDURE — 2500000004 HC RX 250 GENERAL PHARMACY W/ HCPCS (ALT 636 FOR OP/ED): Mod: JZ | Performed by: NURSE ANESTHETIST, CERTIFIED REGISTERED

## 2025-05-14 PROCEDURE — 81025 URINE PREGNANCY TEST: CPT | Performed by: SURGERY

## 2025-05-14 PROCEDURE — 3700000002 HC GENERAL ANESTHESIA TIME - EACH INCREMENTAL 1 MINUTE: Performed by: SURGERY

## 2025-05-14 PROCEDURE — 7100000010 HC PHASE TWO TIME - EACH INCREMENTAL 1 MINUTE: Performed by: SURGERY

## 2025-05-14 PROCEDURE — 24073 EX ARM/ELBOW TUM DEEP 5 CM/>: CPT | Performed by: SURGERY

## 2025-05-14 PROCEDURE — 3600000003 HC OR TIME - INITIAL BASE CHARGE - PROCEDURE LEVEL THREE: Performed by: SURGERY

## 2025-05-14 PROCEDURE — 3700000001 HC GENERAL ANESTHESIA TIME - INITIAL BASE CHARGE: Performed by: SURGERY

## 2025-05-14 PROCEDURE — A24071 PR EXC TUMOR SOFT TISSUE UPPER ARM/ELBOW SUBQ 3+CM: Performed by: ANESTHESIOLOGY

## 2025-05-14 PROCEDURE — 24071 EXC ARM/ELBOW LES SC 3 CM/>: CPT | Performed by: SURGERY

## 2025-05-14 PROCEDURE — A24071 PR EXC TUMOR SOFT TISSUE UPPER ARM/ELBOW SUBQ 3+CM: Performed by: NURSE ANESTHETIST, CERTIFIED REGISTERED

## 2025-05-14 PROCEDURE — 7100000009 HC PHASE TWO TIME - INITIAL BASE CHARGE: Performed by: SURGERY

## 2025-05-14 PROCEDURE — 7100000001 HC RECOVERY ROOM TIME - INITIAL BASE CHARGE: Performed by: SURGERY

## 2025-05-14 PROCEDURE — 3600000008 HC OR TIME - EACH INCREMENTAL 1 MINUTE - PROCEDURE LEVEL THREE: Performed by: SURGERY

## 2025-05-14 PROCEDURE — 7100000002 HC RECOVERY ROOM TIME - EACH INCREMENTAL 1 MINUTE: Performed by: SURGERY

## 2025-05-14 RX ORDER — ALBUTEROL SULFATE 0.83 MG/ML
2.5 SOLUTION RESPIRATORY (INHALATION) ONCE
Status: DISCONTINUED | OUTPATIENT
Start: 2025-05-14 | End: 2025-05-14

## 2025-05-14 RX ORDER — PHENYLEPHRINE HCL IN 0.9% NACL 1 MG/10 ML
SYRINGE (ML) INTRAVENOUS AS NEEDED
Status: DISCONTINUED | OUTPATIENT
Start: 2025-05-14 | End: 2025-05-14

## 2025-05-14 RX ORDER — LIDOCAINE HYDROCHLORIDE 10 MG/ML
0.1 INJECTION, SOLUTION INFILTRATION; PERINEURAL ONCE
Status: DISCONTINUED | OUTPATIENT
Start: 2025-05-14 | End: 2025-05-14 | Stop reason: HOSPADM

## 2025-05-14 RX ORDER — LIDOCAINE HYDROCHLORIDE 10 MG/ML
0.1 INJECTION, SOLUTION INFILTRATION; PERINEURAL ONCE
Status: DISCONTINUED | OUTPATIENT
Start: 2025-05-14 | End: 2025-05-14

## 2025-05-14 RX ORDER — SODIUM CHLORIDE, SODIUM LACTATE, POTASSIUM CHLORIDE, CALCIUM CHLORIDE 600; 310; 30; 20 MG/100ML; MG/100ML; MG/100ML; MG/100ML
100 INJECTION, SOLUTION INTRAVENOUS CONTINUOUS
Status: DISCONTINUED | OUTPATIENT
Start: 2025-05-14 | End: 2025-05-14

## 2025-05-14 RX ORDER — PROPOFOL 10 MG/ML
INJECTION, EMULSION INTRAVENOUS AS NEEDED
Status: DISCONTINUED | OUTPATIENT
Start: 2025-05-14 | End: 2025-05-14

## 2025-05-14 RX ORDER — LIDOCAINE HYDROCHLORIDE 20 MG/ML
INJECTION, SOLUTION EPIDURAL; INFILTRATION; INTRACAUDAL; PERINEURAL AS NEEDED
Status: DISCONTINUED | OUTPATIENT
Start: 2025-05-14 | End: 2025-05-14

## 2025-05-14 RX ORDER — FENTANYL CITRATE 50 UG/ML
INJECTION, SOLUTION INTRAMUSCULAR; INTRAVENOUS AS NEEDED
Status: DISCONTINUED | OUTPATIENT
Start: 2025-05-14 | End: 2025-05-14

## 2025-05-14 RX ORDER — MEPERIDINE HYDROCHLORIDE 25 MG/ML
12.5 INJECTION INTRAMUSCULAR; INTRAVENOUS; SUBCUTANEOUS EVERY 10 MIN PRN
Status: DISCONTINUED | OUTPATIENT
Start: 2025-05-14 | End: 2025-05-14

## 2025-05-14 RX ORDER — SODIUM CHLORIDE, SODIUM LACTATE, POTASSIUM CHLORIDE, CALCIUM CHLORIDE 600; 310; 30; 20 MG/100ML; MG/100ML; MG/100ML; MG/100ML
100 INJECTION, SOLUTION INTRAVENOUS CONTINUOUS
Status: DISCONTINUED | OUTPATIENT
Start: 2025-05-14 | End: 2025-05-14 | Stop reason: HOSPADM

## 2025-05-14 RX ORDER — MIDAZOLAM HYDROCHLORIDE 1 MG/ML
1 INJECTION, SOLUTION INTRAMUSCULAR; INTRAVENOUS ONCE AS NEEDED
Status: DISCONTINUED | OUTPATIENT
Start: 2025-05-14 | End: 2025-05-14 | Stop reason: HOSPADM

## 2025-05-14 RX ORDER — ONDANSETRON HYDROCHLORIDE 2 MG/ML
4 INJECTION, SOLUTION INTRAVENOUS ONCE AS NEEDED
Status: DISCONTINUED | OUTPATIENT
Start: 2025-05-14 | End: 2025-05-14

## 2025-05-14 RX ORDER — BUPIVACAINE HYDROCHLORIDE 5 MG/ML
INJECTION, SOLUTION PERINEURAL AS NEEDED
Status: DISCONTINUED | OUTPATIENT
Start: 2025-05-14 | End: 2025-05-14 | Stop reason: HOSPADM

## 2025-05-14 RX ORDER — ONDANSETRON HYDROCHLORIDE 2 MG/ML
INJECTION, SOLUTION INTRAVENOUS AS NEEDED
Status: DISCONTINUED | OUTPATIENT
Start: 2025-05-14 | End: 2025-05-14

## 2025-05-14 RX ORDER — FENTANYL CITRATE 50 UG/ML
50 INJECTION, SOLUTION INTRAMUSCULAR; INTRAVENOUS EVERY 5 MIN PRN
Status: DISCONTINUED | OUTPATIENT
Start: 2025-05-14 | End: 2025-05-14

## 2025-05-14 RX ORDER — HYDROMORPHONE HYDROCHLORIDE 0.2 MG/ML
0.2 INJECTION INTRAMUSCULAR; INTRAVENOUS; SUBCUTANEOUS EVERY 5 MIN PRN
Status: DISCONTINUED | OUTPATIENT
Start: 2025-05-14 | End: 2025-05-14 | Stop reason: HOSPADM

## 2025-05-14 RX ORDER — ONDANSETRON HYDROCHLORIDE 2 MG/ML
4 INJECTION, SOLUTION INTRAVENOUS ONCE AS NEEDED
Status: DISCONTINUED | OUTPATIENT
Start: 2025-05-14 | End: 2025-05-14 | Stop reason: HOSPADM

## 2025-05-14 RX ORDER — LIDOCAINE HYDROCHLORIDE AND EPINEPHRINE 10; 10 UG/ML; MG/ML
INJECTION, SOLUTION INFILTRATION; PERINEURAL AS NEEDED
Status: DISCONTINUED | OUTPATIENT
Start: 2025-05-14 | End: 2025-05-14 | Stop reason: HOSPADM

## 2025-05-14 RX ORDER — FENTANYL CITRATE 50 UG/ML
50 INJECTION, SOLUTION INTRAMUSCULAR; INTRAVENOUS EVERY 5 MIN PRN
Status: DISCONTINUED | OUTPATIENT
Start: 2025-05-14 | End: 2025-05-14 | Stop reason: HOSPADM

## 2025-05-14 RX ORDER — HYDROMORPHONE HYDROCHLORIDE 0.2 MG/ML
0.2 INJECTION INTRAMUSCULAR; INTRAVENOUS; SUBCUTANEOUS EVERY 5 MIN PRN
Status: DISCONTINUED | OUTPATIENT
Start: 2025-05-14 | End: 2025-05-14

## 2025-05-14 RX ORDER — ALBUTEROL SULFATE 0.83 MG/ML
2.5 SOLUTION RESPIRATORY (INHALATION) ONCE
Status: DISCONTINUED | OUTPATIENT
Start: 2025-05-14 | End: 2025-05-14 | Stop reason: HOSPADM

## 2025-05-14 RX ORDER — CEFAZOLIN SODIUM 2 G/100ML
2 INJECTION, SOLUTION INTRAVENOUS ONCE
Status: COMPLETED | OUTPATIENT
Start: 2025-05-14 | End: 2025-05-14

## 2025-05-14 RX ORDER — MIDAZOLAM HYDROCHLORIDE 1 MG/ML
1 INJECTION, SOLUTION INTRAMUSCULAR; INTRAVENOUS ONCE AS NEEDED
Status: DISCONTINUED | OUTPATIENT
Start: 2025-05-14 | End: 2025-05-14

## 2025-05-14 RX ORDER — MIDAZOLAM HYDROCHLORIDE 1 MG/ML
INJECTION, SOLUTION INTRAMUSCULAR; INTRAVENOUS AS NEEDED
Status: DISCONTINUED | OUTPATIENT
Start: 2025-05-14 | End: 2025-05-14

## 2025-05-14 RX ORDER — TRAMADOL HYDROCHLORIDE 50 MG/1
50 TABLET, FILM COATED ORAL EVERY 4 HOURS PRN
Qty: 30 TABLET | Refills: 0 | Status: SHIPPED | OUTPATIENT
Start: 2025-05-14 | End: 2025-05-24

## 2025-05-14 RX ORDER — MEPERIDINE HYDROCHLORIDE 25 MG/ML
12.5 INJECTION INTRAMUSCULAR; INTRAVENOUS; SUBCUTANEOUS EVERY 10 MIN PRN
Status: DISCONTINUED | OUTPATIENT
Start: 2025-05-14 | End: 2025-05-14 | Stop reason: HOSPADM

## 2025-05-14 RX ADMIN — PROPOFOL 200 MG: 10 INJECTION, EMULSION INTRAVENOUS at 08:45

## 2025-05-14 RX ADMIN — FENTANYL CITRATE 25 MCG: 0.05 INJECTION, SOLUTION INTRAMUSCULAR; INTRAVENOUS at 09:02

## 2025-05-14 RX ADMIN — CEFAZOLIN SODIUM 2 G: 2 INJECTION, SOLUTION INTRAVENOUS at 08:42

## 2025-05-14 RX ADMIN — SODIUM CHLORIDE, POTASSIUM CHLORIDE, SODIUM LACTATE AND CALCIUM CHLORIDE: 600; 310; 30; 20 INJECTION, SOLUTION INTRAVENOUS at 08:34

## 2025-05-14 RX ADMIN — ONDANSETRON 4 MG: 2 INJECTION, SOLUTION INTRAMUSCULAR; INTRAVENOUS at 09:19

## 2025-05-14 RX ADMIN — Medication 100 MCG: at 09:21

## 2025-05-14 RX ADMIN — Medication 100 MCG: at 09:12

## 2025-05-14 RX ADMIN — Medication 100 MCG: at 09:24

## 2025-05-14 RX ADMIN — FENTANYL CITRATE 50 MCG: 0.05 INJECTION, SOLUTION INTRAMUSCULAR; INTRAVENOUS at 08:50

## 2025-05-14 RX ADMIN — LIDOCAINE HYDROCHLORIDE 50 MG: 20 INJECTION, SOLUTION EPIDURAL; INFILTRATION; INTRACAUDAL; PERINEURAL at 08:45

## 2025-05-14 RX ADMIN — MIDAZOLAM 2 MG: 1 INJECTION INTRAMUSCULAR; INTRAVENOUS at 08:36

## 2025-05-14 RX ADMIN — FENTANYL CITRATE 50 MCG: 0.05 INJECTION, SOLUTION INTRAMUSCULAR; INTRAVENOUS at 08:45

## 2025-05-14 ASSESSMENT — COLUMBIA-SUICIDE SEVERITY RATING SCALE - C-SSRS
2. HAVE YOU ACTUALLY HAD ANY THOUGHTS OF KILLING YOURSELF?: NO
6. HAVE YOU EVER DONE ANYTHING, STARTED TO DO ANYTHING, OR PREPARED TO DO ANYTHING TO END YOUR LIFE?: NO
1. IN THE PAST MONTH, HAVE YOU WISHED YOU WERE DEAD OR WISHED YOU COULD GO TO SLEEP AND NOT WAKE UP?: NO

## 2025-05-14 ASSESSMENT — PAIN SCALES - GENERAL
PAINLEVEL_OUTOF10: 1
PAINLEVEL_OUTOF10: 1
PAINLEVEL_OUTOF10: 5 - MODERATE PAIN
PAINLEVEL_OUTOF10: 5 - MODERATE PAIN
PAINLEVEL_OUTOF10: 0 - NO PAIN
PAINLEVEL_OUTOF10: 0 - NO PAIN

## 2025-05-14 ASSESSMENT — PAIN DESCRIPTION - DESCRIPTORS
DESCRIPTORS: ACHING
DESCRIPTORS: DULL
DESCRIPTORS: ACHING;DULL
DESCRIPTORS: ACHING;DULL

## 2025-05-14 ASSESSMENT — PAIN - FUNCTIONAL ASSESSMENT
PAIN_FUNCTIONAL_ASSESSMENT: 0-10

## 2025-05-14 NOTE — POST-PROCEDURE NOTE
Pt arrived to [facility] via cart with RN. Gingerale and cookies taken well. 2 ice bags sent home with pt.  Up to bathroom. No trouble voiding.  Home going medications given to the pt by RX to go.

## 2025-05-14 NOTE — ANESTHESIA PREPROCEDURE EVALUATION
Patient: Danay Mccall    Procedure Information       Date/Time: 05/14/25 0845    Procedure: Elbow Lesion Excision (Bilateral)    Location: [facility] / [facility]    Surgeons: Gladys Chua MD            Relevant Problems   Cardiac   (+) Hyperlipidemia      Neuro   (+) Anxiety      Endocrine   (+) Obesity       Clinical information reviewed:   Tobacco  Allergies   Problems  Med Hx  Surg Hx   Fam Hx          NPO Detail:  No data recorded     Physical Exam    Airway  Mallampati: III  TM distance: >3 FB     Cardiovascular    Dental    Pulmonary    Abdominal            Anesthesia Plan    History of general anesthesia?: yes  History of complications of general anesthesia?: no    ASA 3     general     intravenous induction   Anesthetic plan and risks discussed with patient.

## 2025-05-14 NOTE — ANESTHESIA PROCEDURE NOTES
Airway  Date/Time: 5/14/2025 8:47 AM  Reason: elective    Airway not difficult    Staffing  Performed: CRNA   Authorized by: John Pascal DO    Performed by: RUFUS Rubi-CRNA  Patient location during procedure: OR    Patient Condition  Indications for airway management: anesthesia  Patient position: sniffing  MILS maintained throughout  Sedation level: deep     Final Airway Details   Preoxygenated: yes  Final airway type: supraglottic airway  Successful airway:   Size: 3  Number of attempts at approach: 1

## 2025-05-14 NOTE — ANESTHESIA POSTPROCEDURE EVALUATION
Patient: Danay Mcclal    Procedure Summary       Date: 05/14/25 Room / Location: [facility] / [facility]    Anesthesia Start: 0834 Anesthesia Stop: 0941    Procedure: Lesion Excision x 1 right upper arm, x2 left upper arm (Bilateral) Diagnosis:       Mass of soft tissue of left upper extremity      Mass of soft tissue of right upper extremity      (Mass of soft tissue of left upper extremity [M79.89])      (Mass of soft tissue of right upper extremity [M79.89])    Surgeons: Gladys Chua MD Responsible Provider: John Pascal DO    Anesthesia Type: general ASA Status: 3            Anesthesia Type: general    Vitals Value Taken Time   /90 05/14/25 10:06   Temp 36 °C (96.8 °F) 05/14/25 09:39   Pulse 87 05/14/25 10:06   Resp 16 05/14/25 10:06   SpO2 99 % 05/14/25 10:06   Vitals shown include unfiled device data.    Anesthesia Post Evaluation    Patient location during evaluation: bedside  Patient participation: complete - patient participated  Level of consciousness: awake  Pain management: adequate  Airway patency: patent  Cardiovascular status: acceptable  Respiratory status: acceptable  Hydration status: acceptable  Postoperative Nausea and Vomiting: none        There were no known notable events for this encounter.

## 2025-05-14 NOTE — OP NOTE
Lesion Excision x 1 right upper arm, x2 left upper arm (B) Operative Note     Date: 2025  OR Location: [facility]    Name: Danay Mccall, : 1965, Age: 60 y.o., MRN: [id number], Sex: female    Diagnosis  Pre-op Diagnosis      * Mass of soft tissue of left upper extremity [M79.89]     * Mass of soft tissue of right upper extremity [M79.89] Post-op Diagnosis     * Mass of soft tissue of left upper extremity [M79.89]     * Mass of soft tissue of right upper extremity [M79.89]     Procedures  Lesion Excision x 1 right upper arm, x2 left upper arm  68279 - FL EXC TUMOR SOFT TISSUE UPPER ARM/ELBOW SUBQ 3CM/>      Surgeons      * Gladys Chua - Primary    Resident/Fellow/Other Assistant:  Surgeons and Role:     * Newton Vázquez PA-C - [facility] First Assist    Staff:   Circulator: Oz Mcadams Person: Renuka Gerardo Scrub: Lb    Anesthesia Staff: Anesthesiologist: John Pascal DO  CRNA: RUFUS Rubi-ISAÍAS    Procedure Summary  Anesthesia: General  ASA: III  Estimated Blood Loss: 2mL  Intra-op Medications:   Administrations occurring from 0845 to 1015 on 25:   Medication Name Total Dose   lidocaine-epinephrine (Xylocaine W/EPI) 1 %-1:100,000 injection 9 mL   BUPivacaine HCl (Marcaine) 0.5 % (5 mg/mL) injection 7.5 mL   dexmedeTOMIDine (Precedex) bolus from bag 6 mcg   fentaNYL PF 0.05 mg/mL 125 mcg   lidocaine PF (Xylocaine-MPF)  2 % 50 mg   ondansetron 2 mg/mL 4 mg   phenylephrine 100 mcg/mL syringe 10 mL (prefilled) 200 mcg   propofol (Diprivan) injection 10 mg/mL 200 mg              Anesthesia Record               Intraprocedure I/O Totals          Intake    Dexmedetomidine 0.00 mL    The total shown is the total volume documented since Anesthesia Start was filed.    ceFAZolin (Ancef) 2 g in dextrose (iso)  mL 100.00 mL    Total Intake 100 mL          Specimen:   ID Type Source Tests Collected by Time   1 : Right arm soft tissue mass Tissue SOFT TISSUE RESECTION SURGICAL PATHOLOGY EXAM  Gladys Chua MD 5/14/2025 0911   2 : Left elbow soft tissue mass Tissue SOFT TISSUE RESECTION SURGICAL PATHOLOGY EXAM Gladys Chua MD 5/14/2025 0911   3 : left shoulder soft tissue mas Tissue SOFT TISSUE RESECTION SURGICAL PATHOLOGY EXAM Gladys Chua MD 5/14/2025 0911                 Drains and/or Catheters: * None in log *    Tourniquet Times:         Implants:     Findings: Right arm lesion measuring 4 x 3 cm lipoma, left distal arm lesion measuring 1 x 1 cm inclusion cyst of the dermis, left upper arm lesion measuring 5 x 2 cm lipoma    Indications: Danay Mccall is an 60 y.o. female who is having surgery for Mass of soft tissue of left upper extremity [M79.89]  Mass of soft tissue of right upper extremity [M79.89].     The patient was seen in the preoperative area. The risks, benefits, complications, treatment options, non-operative alternatives, expected recovery and outcomes were discussed with the patient. The possibilities of reaction to medication, pulmonary aspiration, injury to surrounding structures, bleeding, recurrent infection, the need for additional procedures, failure to diagnose a condition, and creating a complication requiring transfusion or operation were discussed with the patient. The patient concurred with the proposed plan, giving informed consent.  The site of surgery was properly noted/marked if necessary per policy. The patient has been actively warmed in preoperative area. Preoperative antibiotics have been ordered and given within 1 hours of incision. Venous thrombosis prophylaxis have been ordered including bilateral sequential compression devices    Procedure Details: Patient brought the room in the supine position.  Bilateral arms were prepped and draped in sterile fashion.  Right arm was addressed first.  Marking pen was used to delineate the line of incision upper arm just proximal to the elbow.  15 blade scalpel was used to make a 3 cm incision in the skin and  underlying subcutaneous tissue  electrocautery to the mass.  It was excised in 2 pieces measuring 4 x 3 cm.  It was a lipoma.  Hemostasis maintained the excisional bed use electrocautery.  Incision was closed with interrupted 3-0 Vicryl followed by running 4-0 Monocryl    Operation continue with the distal left arm lesion.  This was just proximal to the elbow.  It was a cyst within the dermis.  An elliptical incision was made with a marking pen measuring 1 x 1/2 cm.  Local is infiltrated tissue 15 blade scalpel was used to make the incision in the skin it was sharply dissected out to subcu fat.  This was closed with interrupted 3-0 Vicryl followed by 4-0 Monocryl    Operation finished with a third lesion which was the proximal left arm area.  Marking pen was used to delineate the line of incision measuring 3 cm local was infiltrated into the dermis 15 blade scalpel was used to make a 3 cm incision in the skin.  The lesion was dissected out both bluntly and with electrocautery and measured 5 x 2 cm it was a lipoma.  Hemostasis maintained the excisional bed using cautery.  All 3 excisional beds were irrigated prior to closure.  The last lesion was closed with interrupted 3-0 Vicryl followed by running 4-0 Monocryl Steri-Strips and sterile dressings were applied patient Toller procedure well LMA was removed in the operating room and she was transferred to recovery with rails up all counts were good  Evidence of Infection: No   Complications:  None; patient tolerated the procedure well.    Disposition: PACU - hemodynamically stable.  Condition: stable             Task Performed by [facility] First Assist or Physician Assistant:   Newton CUETO/NP, was necessary to assist on this case due to the nature of the case and difficulty. During the case he served as my assist by exposure and closure      Additional Details:     Attending Attestation: I was present and scrubbed for the entire procedure.    Gladys ALEX  Toma  Phone Number: [phone]

## 2025-05-19 NOTE — PROGRESS NOTES
Subjective   Patient ID: Danay Mccall is a 60 y.o. female who presents for S/P Excision of elbow lesion of right and left upper extremity x2 2025.   HPI  Patient returns to clinic and presents for  S/P Excisin of elbow lesion of right and left upper extremity x2 2025.   Patient with mild numbness in the right medial arm.  The surface area is however decreasing.  Findings: Right arm lesion measuring 4 x 3 cm lipoma, left distal arm lesion measuring 1 x 1 cm inclusion cyst of the dermis, left upper arm lesion measuring 5 x 2 cm lipoma     FINAL DIAGNOSIS      A. Right arm mass, excision:  -- Fragments of mature adipose tissue consistent with lipoma.  -- Fragments of benign lymph node(s).     B. Left elbow mass, excision:  -- Mature adipose tissue consistent with lipoma, with ossification.     C. Left shoulder mass, excision:  -- Fragments of mature adipose tissue consistent with lipoma.       Social History:  Tobacco Use - NO  Do you use any recreational drugs -NO  Alcohol Use -NO  Caffeine Intake - YES  Working - F/T  Marital status -   Living with -     Past Medical History:   Diagnosis Date    Dermoid cyst of head     Hyperlipidemia     Obesity     Scoliosis      Family History   Problem Relation Name Age of Onset    Heart attack Mother      No Known Problems Father      Cystic fibrosis Sister      No Known Problems Sister      No Known Problems Brother      No Known Problems Brother       Past Surgical History:   Procedure Laterality Date     SECTION, LOW TRANSVERSE      X 2    EYE SURGERY Left     Excision medial left eye lesion    OTHER SURGICAL HISTORY      Excision scalp lipoma    SKIN LESION EXCISION Bilateral 2025    right arm x1 and left upper arm x2    WISDOM TOOTH EXTRACTION       Allergies   Allergen Reactions    Red Dye Rash       Review of Systems  BP (!) 165/104 (BP Location: Left arm, Patient Position: Sitting, BP Cuff Size: Adult)   Pulse 75   Temp  "36.6 °C (97.8 °F) (Temporal)   Resp 17   Ht 1.6 m (5' 3\")   Wt 121 kg (266 lb)   LMP 04/30/2025 (Approximate)   SpO2 98%   BMI 47.12 kg/m²    Objective   Physical Exam  All incisions clean dry and intact  Assessment/Plan   Problem List Items Addressed This Visit           ICD-10-CM    Mass of soft tissue of left upper extremity M79.89    Status post excision of lipoma.  Pathology reviewed.  Good healing.  Follow-up as needed         Mass of soft tissue of right upper extremity - Primary M79.89    Status post excision of a lipoma.  Pathology reviewed.  Good healing.                 Hoda Nicole MA 05/29/25 9:36 AM   "

## 2025-05-20 LAB
LABORATORY COMMENT REPORT: NORMAL
PATH REPORT.FINAL DX SPEC: NORMAL
PATH REPORT.GROSS SPEC: NORMAL
PATH REPORT.RELEVANT HX SPEC: NORMAL
PATH REPORT.TOTAL CANCER: NORMAL

## 2025-05-29 ENCOUNTER — OFFICE VISIT (OUTPATIENT)
Dept: SURGERY | Facility: CLINIC | Age: 60
End: 2025-05-29
Payer: COMMERCIAL

## 2025-05-29 VITALS
OXYGEN SATURATION: 98 % | SYSTOLIC BLOOD PRESSURE: 165 MMHG | DIASTOLIC BLOOD PRESSURE: 104 MMHG | HEIGHT: 63 IN | HEART RATE: 75 BPM | RESPIRATION RATE: 17 BRPM | BODY MASS INDEX: 47.13 KG/M2 | WEIGHT: 266 LBS | TEMPERATURE: 97.8 F

## 2025-05-29 DIAGNOSIS — M79.89 MASS OF SOFT TISSUE OF LEFT UPPER EXTREMITY: ICD-10-CM

## 2025-05-29 DIAGNOSIS — M79.89 MASS OF SOFT TISSUE OF RIGHT UPPER EXTREMITY: Primary | ICD-10-CM

## 2025-05-29 PROCEDURE — 3008F BODY MASS INDEX DOCD: CPT | Performed by: SURGERY

## 2025-05-29 PROCEDURE — 1036F TOBACCO NON-USER: CPT | Performed by: SURGERY

## 2025-05-29 PROCEDURE — 99211 OFF/OP EST MAY X REQ PHY/QHP: CPT | Performed by: SURGERY

## 2025-05-29 ASSESSMENT — PAIN SCALES - GENERAL: PAINLEVEL_OUTOF10: 0-NO PAIN

## 2025-05-29 ASSESSMENT — LIFESTYLE VARIABLES
HOW OFTEN DO YOU HAVE A DRINK CONTAINING ALCOHOL: MONTHLY OR LESS
AUDIT-C TOTAL SCORE: 1
SKIP TO QUESTIONS 9-10: 1
HOW OFTEN DO YOU HAVE SIX OR MORE DRINKS ON ONE OCCASION: NEVER
HOW MANY STANDARD DRINKS CONTAINING ALCOHOL DO YOU HAVE ON A TYPICAL DAY: 1 OR 2

## 2025-05-29 ASSESSMENT — PATIENT HEALTH QUESTIONNAIRE - PHQ9
SUM OF ALL RESPONSES TO PHQ9 QUESTIONS 1 & 2: 0
1. LITTLE INTEREST OR PLEASURE IN DOING THINGS: NOT AT ALL
2. FEELING DOWN, DEPRESSED OR HOPELESS: NOT AT ALL

## 2025-05-29 ASSESSMENT — ENCOUNTER SYMPTOMS
DEPRESSION: 0
LOSS OF SENSATION IN FEET: 0
OCCASIONAL FEELINGS OF UNSTEADINESS: 0

## 2025-06-10 DIAGNOSIS — Z12.31 ENCOUNTER FOR SCREENING MAMMOGRAM FOR BREAST CANCER: ICD-10-CM

## (undated) DEVICE — SUTURE, MONOCRYL, 4-0, 27 IN, PS-2, UNDYED

## (undated) DEVICE — GLOVE, SURGICAL, PROTEXIS PI , 6.5, PF, LF

## (undated) DEVICE — CAUTERY, PENCIL, PUSH BUTTON, SMOKE EVAC, 70MM

## (undated) DEVICE — APPLICATOR, CHLORAPREP, W/ORANGE TINT, 26ML

## (undated) DEVICE — SUTURE, VICRYL, 3-0, 27 IN, SH, VIOLET

## (undated) DEVICE — TIP, SUCTION, YANKAUER, BULB, ADULT

## (undated) DEVICE — SOLUTION, IRRIGATION, X RX SODIUM CHL 0.9%, 1000ML BTL

## (undated) DEVICE — ELECTRODE, ELECTROSURGICAL, BLADE, INSULATED, ENT/IMA, STERILE

## (undated) DEVICE — STRIP, SKIN CLOSURE, STERI STRIP, REINFORCED, 0.5 X 4 IN

## (undated) DEVICE — SLEEVE, VASO PRESS, CALF GARMENT, MEDIUM, GREEN

## (undated) DEVICE — STRIP, SKIN CLOSURE, COMPOUND BENZION TINCTURE 0.6ML

## (undated) DEVICE — TUBING, SUCTION, 6MM X 10, CLEAN N-COND

## (undated) DEVICE — BLADE, SURGICAL, POLYMER COATED P15, STERILE, DISP

## (undated) DEVICE — SUTURE, VICRYL, 3-0, 54 IN, CTD, UNDYED

## (undated) DEVICE — Device

## (undated) DEVICE — DRAPE, SHEET, LAPAROTOMY, INCISE, FENESTRATED, 105 X 115 IN, STERILE

## (undated) DEVICE — SPONGE, GAUZE, AVANT, STERILE, NONWOVEN, 4PLY, 4 X 4, STANDARD